# Patient Record
Sex: MALE | Race: WHITE | NOT HISPANIC OR LATINO | URBAN - METROPOLITAN AREA
[De-identification: names, ages, dates, MRNs, and addresses within clinical notes are randomized per-mention and may not be internally consistent; named-entity substitution may affect disease eponyms.]

---

## 2019-02-04 ENCOUNTER — IMPORTED ENCOUNTER (OUTPATIENT)
Dept: URBAN - METROPOLITAN AREA CLINIC 31 | Facility: CLINIC | Age: 74
End: 2019-02-04

## 2019-02-04 PROBLEM — H10.402: Noted: 2019-02-04

## 2019-02-04 PROCEDURE — 99213 OFFICE O/P EST LOW 20 MIN: CPT

## 2019-02-04 NOTE — PATIENT DISCUSSION
Allergic Conjunctivitis OS -- The condition was  discussed with the patient. Avoidance of allergens and cool compresses were recommended. Start Pred forte QID OS and Erythromycin tani TID OS. Return for an appointment in 3 days for office call. with Dr. Omayra Soria.

## 2019-02-07 ENCOUNTER — IMPORTED ENCOUNTER (OUTPATIENT)
Dept: URBAN - METROPOLITAN AREA CLINIC 31 | Facility: CLINIC | Age: 74
End: 2019-02-07

## 2019-02-07 PROBLEM — H11.153: Noted: 2019-02-07

## 2019-02-07 PROBLEM — H40.013: Noted: 2019-02-07

## 2019-02-07 PROBLEM — H10.402: Noted: 2019-02-07

## 2019-02-07 PROBLEM — H25.13: Noted: 2019-02-07

## 2019-02-07 PROCEDURE — 99213 OFFICE O/P EST LOW 20 MIN: CPT

## 2019-02-07 NOTE — PATIENT DISCUSSION
1.  1.  Allergic Conjunctivitis OS  resolved-- The condition was  discussed with the patient. Avoidance of allergens and cool compresses were recommended. Stop Pred forte QID OS and Erythromycin tani TID OS. 2.  Nuclear Sclerotic Cataract OU: Explained how cataracts can effect vision. Recommend clinical observation. The patient was advised to contact us if any change or worsening of vision. 3. Pinguecula OU --Reviewed that growth is caused by the cumulative effect of sun exposure over time and that it does not extend on to the cornea. Recommend UV protection to prevent progression and artificial tears prn for comfort. Return for continued monitoring. 4. Glaucoma suspect OU - optic nerve cupping. Needs eval including OCT optic nerves. Return for an appointment in 2 months for comprehensive exam. with Dr. Rebekah Avery Patient was to keep appt w/ [facility] as scheduled.

## 2019-04-08 ENCOUNTER — IMPORTED ENCOUNTER (OUTPATIENT)
Dept: URBAN - METROPOLITAN AREA CLINIC 31 | Facility: CLINIC | Age: 74
End: 2019-04-08

## 2019-04-08 PROBLEM — H04.123: Noted: 2019-04-08

## 2019-04-08 PROBLEM — H40.013: Noted: 2019-04-08

## 2019-04-08 PROBLEM — H25.813: Noted: 2019-04-08

## 2019-04-08 PROBLEM — H17.89: Noted: 2019-04-08

## 2019-04-08 PROCEDURE — 92133 CPTRZD OPH DX IMG PST SGM ON: CPT

## 2019-04-08 PROCEDURE — 92014 COMPRE OPH EXAM EST PT 1/>: CPT

## 2019-04-08 NOTE — PATIENT DISCUSSION
1.  Glaucoma suspect OU - c/d asymmetry normal IOP. OCT shows possible mild thinning. Will continue to monitor. 2. Combined Types of Cataract OU: Explained how cataracts can effect vision. Recommend clinical observation. The patient was advised to contact us if any change or worsening of vision. 3. S/P  Lasik:h/o ingrowth with removal of cells stable. 4. Dry Eye OU:  Continue current management with Artificial Tears. 5.  Return for an appointment in 6 months for pressure check. VF 24-2. with Dr. Flavio Frankel.

## 2019-10-01 ENCOUNTER — IMPORTED ENCOUNTER (OUTPATIENT)
Dept: URBAN - METROPOLITAN AREA CLINIC 31 | Facility: CLINIC | Age: 74
End: 2019-10-01

## 2019-10-01 PROBLEM — H17.89: Noted: 2019-10-01

## 2019-10-01 PROBLEM — H25.813: Noted: 2019-10-01

## 2019-10-01 PROBLEM — H04.123: Noted: 2019-10-01

## 2019-10-01 PROBLEM — H40.1231: Noted: 2019-10-01

## 2019-10-01 PROCEDURE — 99214 OFFICE O/P EST MOD 30 MIN: CPT

## 2019-10-01 PROCEDURE — 92083 EXTENDED VISUAL FIELD XM: CPT

## 2019-10-01 NOTE — PATIENT DISCUSSION
1.  Low Tension Glaucoma OU: Asymetric cupping Visual field changes today. Start Brimonidine ou 2x/d. Torsten Dior Discussed importance of compliance. Will continue to monitor. Return for an appointment in 6 weeks for pressure check. Pachymetry. with Dr. Faina Anderson. 2.  Combined Types of Cataract OU: Explained how cataracts can effect vision. Recommend clinical observation. The patient was advised to contact us if any change or worsening of vision. 3. S/P  Lasik: s/p removal of ingrowth OS stable surface4. Dry Eye OU:  Continue current management with Artificial Tears. 5.  Return for an appointment in 6 weeks for pressure check. with Dr. Faina Anderson.

## 2019-12-04 ENCOUNTER — IMPORTED ENCOUNTER (OUTPATIENT)
Dept: URBAN - METROPOLITAN AREA CLINIC 31 | Facility: CLINIC | Age: 74
End: 2019-12-04

## 2019-12-04 PROBLEM — H04.123: Noted: 2019-12-04

## 2019-12-04 PROBLEM — H25.813: Noted: 2019-12-04

## 2019-12-04 PROBLEM — H40.1232: Noted: 2019-12-04

## 2019-12-04 PROCEDURE — 99213 OFFICE O/P EST LOW 20 MIN: CPT

## 2019-12-04 NOTE — PATIENT DISCUSSION
1.  Low Tension Glaucoma OU - Good response to brimonidine IOP at target. Continue with current treatment plan. Discussed importance of compliance. Will continue to monitor. 2.  Combined Types of Cataract OU: Explained how cataracts can effect vision. Recommend clinical observation. The patient was advised to contact us if any change or worsening of vision. 3. Dry Eye OU:  Continue current management with Artificial Tears. 4.  Return for an appointment in 4 months for pressure check. with Dr. Eron Donaldson.

## 2020-07-29 ENCOUNTER — IMPORTED ENCOUNTER (OUTPATIENT)
Dept: URBAN - METROPOLITAN AREA CLINIC 31 | Facility: CLINIC | Age: 75
End: 2020-07-29

## 2020-07-29 PROBLEM — H25.813: Noted: 2020-07-29

## 2020-07-29 PROBLEM — H40.1211: Noted: 2020-07-29

## 2020-07-29 PROBLEM — H40.1222: Noted: 2020-07-29

## 2020-07-29 PROCEDURE — 99213 OFFICE O/P EST LOW 20 MIN: CPT

## 2020-07-29 NOTE — PATIENT DISCUSSION
Low Tension Glaucoma OD:  IOP at target ou. Continue with current treatment plan. Discussed importance of compliance.   Will continue to monitor for stability or progression

## 2021-12-09 ENCOUNTER — IMPORTED ENCOUNTER (OUTPATIENT)
Dept: URBAN - METROPOLITAN AREA CLINIC 31 | Facility: CLINIC | Age: 76
End: 2021-12-09

## 2021-12-09 PROCEDURE — 92083 EXTENDED VISUAL FIELD XM: CPT

## 2021-12-09 PROCEDURE — 92134 CPTRZ OPH DX IMG PST SGM RTA: CPT

## 2021-12-09 PROCEDURE — 92133 CPTRZD OPH DX IMG PST SGM ON: CPT

## 2021-12-09 PROCEDURE — 99214 OFFICE O/P EST MOD 30 MIN: CPT

## 2021-12-09 NOTE — PATIENT DISCUSSION
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 I have no access to MRI report or the imaging itself today. He is currently undergoing endocrinology work up. 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 He reports no recent visual issues. Repeat Vf 2 weeks and then q3 months. Get report

## 2022-03-23 ENCOUNTER — IMPORTED ENCOUNTER (OUTPATIENT)
Dept: URBAN - METROPOLITAN AREA CLINIC 31 | Facility: CLINIC | Age: 77
End: 2022-03-23

## 2022-03-23 PROBLEM — H40.1232: Noted: 2022-03-23

## 2022-03-23 PROBLEM — H25.813: Noted: 2022-03-23

## 2022-03-23 PROBLEM — H17.89: Noted: 2022-03-23

## 2022-03-23 PROCEDURE — 99214 OFFICE O/P EST MOD 30 MIN: CPT

## 2022-03-23 NOTE — PATIENT DISCUSSION
1.  Low Tension Glaucoma OU - IOP excellent on drops VF and OCT stable in 12/21. CPMs/p removal of pituitary tumor. 2. Combined Types of Cataract OU: Explained how cataracts can effect vision. Recommend clinical observation. The patient was advised to contact us if any change or worsening of vision. 3. S/P  Lasik: some flap scarring stable4. Return for an appointment in 12 months for comprehensive exam. VF 24-2. with Dr. Denver Curb.

## 2022-04-02 ASSESSMENT — VISUAL ACUITY
OS_CC: 20/70
OD_CC: 20/20
OD_CC: 20/25
OD_CC: 20/20-2
OD_CC: 20/30
OS_CC: 20/20-2
OS_CC: 20/25-2
OD_CC: 20/20
OS_CC: 20/30+1
OD_CC: 20/25
OS_CC: 20/30
OD_CC: 20/20-1
OS_PH: SC 20/25 -2
OD_CC: 20/25+1
OS_CC: 20/30-2
OS_CC: 20/30
OS_CC: 20/30-2

## 2022-04-02 ASSESSMENT — TONOMETRY
OS_IOP_MMHG: 9
OD_IOP_MMHG: 11
OD_IOP_MMHG: 8
OD_IOP_MMHG: 9
OS_IOP_MMHG: 12
OS_IOP_MMHG: 10
OS_IOP_MMHG: 8
OS_IOP_MMHG: 13
OD_IOP_MMHG: 8
OD_IOP_MMHG: 12
OD_IOP_MMHG: 13
OD_IOP_MMHG: 10
OS_IOP_MMHG: 9
OS_IOP_MMHG: 12

## 2022-07-09 ENCOUNTER — TELEPHONE ENCOUNTER (OUTPATIENT)
Dept: URBAN - METROPOLITAN AREA CLINIC 121 | Facility: CLINIC | Age: 77
End: 2022-07-09

## 2022-07-10 ENCOUNTER — TELEPHONE ENCOUNTER (OUTPATIENT)
Dept: URBAN - METROPOLITAN AREA CLINIC 121 | Facility: CLINIC | Age: 77
End: 2022-07-10

## 2022-07-10 RX ORDER — METOPROLOL SUCCINATE 50 MG/1
TABLET, EXTENDED RELEASE ORAL
Refills: 0 | Status: ACTIVE | COMMUNITY
Start: 2013-04-17